# Patient Record
Sex: FEMALE | Race: BLACK OR AFRICAN AMERICAN | NOT HISPANIC OR LATINO
[De-identification: names, ages, dates, MRNs, and addresses within clinical notes are randomized per-mention and may not be internally consistent; named-entity substitution may affect disease eponyms.]

---

## 2020-11-16 ENCOUNTER — RESULT REVIEW (OUTPATIENT)
Age: 40
End: 2020-11-16

## 2024-05-04 ENCOUNTER — EMERGENCY (EMERGENCY)
Facility: HOSPITAL | Age: 44
LOS: 1 days | Discharge: HOME | End: 2024-05-04
Attending: EMERGENCY MEDICINE | Admitting: EMERGENCY MEDICINE
Payer: MEDICAID

## 2024-05-04 VITALS
HEART RATE: 73 BPM | DIASTOLIC BLOOD PRESSURE: 83 MMHG | OXYGEN SATURATION: 100 % | SYSTOLIC BLOOD PRESSURE: 169 MMHG | TEMPERATURE: 98 F | RESPIRATION RATE: 16 BRPM

## 2024-05-04 VITALS
TEMPERATURE: 99 F | OXYGEN SATURATION: 100 % | RESPIRATION RATE: 16 BRPM | DIASTOLIC BLOOD PRESSURE: 77 MMHG | HEART RATE: 70 BPM | SYSTOLIC BLOOD PRESSURE: 143 MMHG

## 2024-05-04 LAB
ALBUMIN SERPL ELPH-MCNC: 3.8 G/DL — SIGNIFICANT CHANGE UP (ref 3.3–5)
ALBUMIN SERPL ELPH-MCNC: 4 G/DL — SIGNIFICANT CHANGE UP (ref 3.3–5)
ALP SERPL-CCNC: 72 U/L — SIGNIFICANT CHANGE UP (ref 40–120)
ALP SERPL-CCNC: 76 U/L — SIGNIFICANT CHANGE UP (ref 40–120)
ALT FLD-CCNC: 13 U/L — SIGNIFICANT CHANGE UP (ref 4–33)
ALT FLD-CCNC: SIGNIFICANT CHANGE UP U/L (ref 4–33)
ANION GAP SERPL CALC-SCNC: 13 MMOL/L — SIGNIFICANT CHANGE UP (ref 7–14)
ANION GAP SERPL CALC-SCNC: 15 MMOL/L — HIGH (ref 7–14)
APPEARANCE UR: ABNORMAL
AST SERPL-CCNC: 12 U/L — SIGNIFICANT CHANGE UP (ref 4–32)
AST SERPL-CCNC: SIGNIFICANT CHANGE UP U/L (ref 4–32)
BASOPHILS # BLD AUTO: 0.04 K/UL — SIGNIFICANT CHANGE UP (ref 0–0.2)
BASOPHILS NFR BLD AUTO: 0.3 % — SIGNIFICANT CHANGE UP (ref 0–2)
BILIRUB SERPL-MCNC: 0.8 MG/DL — SIGNIFICANT CHANGE UP (ref 0.2–1.2)
BILIRUB SERPL-MCNC: 0.9 MG/DL — SIGNIFICANT CHANGE UP (ref 0.2–1.2)
BILIRUB UR-MCNC: ABNORMAL
BUN SERPL-MCNC: 7 MG/DL — SIGNIFICANT CHANGE UP (ref 7–23)
BUN SERPL-MCNC: 8 MG/DL — SIGNIFICANT CHANGE UP (ref 7–23)
CALCIUM SERPL-MCNC: 8.5 MG/DL — SIGNIFICANT CHANGE UP (ref 8.4–10.5)
CALCIUM SERPL-MCNC: 8.8 MG/DL — SIGNIFICANT CHANGE UP (ref 8.4–10.5)
CHLORIDE SERPL-SCNC: 101 MMOL/L — SIGNIFICANT CHANGE UP (ref 98–107)
CHLORIDE SERPL-SCNC: 102 MMOL/L — SIGNIFICANT CHANGE UP (ref 98–107)
CO2 SERPL-SCNC: 16 MMOL/L — LOW (ref 22–31)
CO2 SERPL-SCNC: 21 MMOL/L — LOW (ref 22–31)
COLOR SPEC: ABNORMAL
CREAT SERPL-MCNC: 0.51 MG/DL — SIGNIFICANT CHANGE UP (ref 0.5–1.3)
CREAT SERPL-MCNC: 0.61 MG/DL — SIGNIFICANT CHANGE UP (ref 0.5–1.3)
DIFF PNL FLD: ABNORMAL
EGFR: 115 ML/MIN/1.73M2 — SIGNIFICANT CHANGE UP
EGFR: 120 ML/MIN/1.73M2 — SIGNIFICANT CHANGE UP
EOSINOPHIL # BLD AUTO: 0.1 K/UL — SIGNIFICANT CHANGE UP (ref 0–0.5)
EOSINOPHIL NFR BLD AUTO: 0.7 % — SIGNIFICANT CHANGE UP (ref 0–6)
GLUCOSE SERPL-MCNC: 101 MG/DL — HIGH (ref 70–99)
GLUCOSE SERPL-MCNC: 91 MG/DL — SIGNIFICANT CHANGE UP (ref 70–99)
GLUCOSE UR QL: NEGATIVE MG/DL — SIGNIFICANT CHANGE UP
HCG SERPL-ACNC: <1 MIU/ML — SIGNIFICANT CHANGE UP
HCT VFR BLD CALC: 45.4 % — HIGH (ref 34.5–45)
HGB BLD-MCNC: 14.9 G/DL — SIGNIFICANT CHANGE UP (ref 11.5–15.5)
IANC: 9.82 K/UL — HIGH (ref 1.8–7.4)
IMM GRANULOCYTES NFR BLD AUTO: 0.4 % — SIGNIFICANT CHANGE UP (ref 0–0.9)
KETONES UR-MCNC: ABNORMAL MG/DL
LEUKOCYTE ESTERASE UR-ACNC: ABNORMAL
LYMPHOCYTES # BLD AUTO: 22.3 % — SIGNIFICANT CHANGE UP (ref 13–44)
LYMPHOCYTES # BLD AUTO: 3.1 K/UL — SIGNIFICANT CHANGE UP (ref 1–3.3)
MCHC RBC-ENTMCNC: 28.6 PG — SIGNIFICANT CHANGE UP (ref 27–34)
MCHC RBC-ENTMCNC: 32.8 GM/DL — SIGNIFICANT CHANGE UP (ref 32–36)
MCV RBC AUTO: 87.1 FL — SIGNIFICANT CHANGE UP (ref 80–100)
MONOCYTES # BLD AUTO: 0.76 K/UL — SIGNIFICANT CHANGE UP (ref 0–0.9)
MONOCYTES NFR BLD AUTO: 5.5 % — SIGNIFICANT CHANGE UP (ref 2–14)
NEUTROPHILS # BLD AUTO: 9.82 K/UL — HIGH (ref 1.8–7.4)
NEUTROPHILS NFR BLD AUTO: 70.8 % — SIGNIFICANT CHANGE UP (ref 43–77)
NITRITE UR-MCNC: POSITIVE
NRBC # BLD: 0 /100 WBCS — SIGNIFICANT CHANGE UP (ref 0–0)
NRBC # FLD: 0 K/UL — SIGNIFICANT CHANGE UP (ref 0–0)
PH UR: 5 — SIGNIFICANT CHANGE UP (ref 5–8)
PLATELET # BLD AUTO: 352 K/UL — SIGNIFICANT CHANGE UP (ref 150–400)
POTASSIUM SERPL-MCNC: 3.7 MMOL/L — SIGNIFICANT CHANGE UP (ref 3.5–5.3)
POTASSIUM SERPL-MCNC: SIGNIFICANT CHANGE UP MMOL/L (ref 3.5–5.3)
POTASSIUM SERPL-SCNC: 3.7 MMOL/L — SIGNIFICANT CHANGE UP (ref 3.5–5.3)
POTASSIUM SERPL-SCNC: SIGNIFICANT CHANGE UP MMOL/L (ref 3.5–5.3)
PROT SERPL-MCNC: 7.7 G/DL — SIGNIFICANT CHANGE UP (ref 6–8.3)
PROT SERPL-MCNC: 8.7 G/DL — HIGH (ref 6–8.3)
PROT UR-MCNC: 100 MG/DL
RBC # BLD: 5.21 M/UL — HIGH (ref 3.8–5.2)
RBC # FLD: 15.3 % — HIGH (ref 10.3–14.5)
SODIUM SERPL-SCNC: 131 MMOL/L — LOW (ref 135–145)
SODIUM SERPL-SCNC: 137 MMOL/L — SIGNIFICANT CHANGE UP (ref 135–145)
SP GR SPEC: 1.02 — SIGNIFICANT CHANGE UP (ref 1–1.03)
UROBILINOGEN FLD QL: 0.2 MG/DL — SIGNIFICANT CHANGE UP (ref 0.2–1)
WBC # BLD: 13.88 K/UL — HIGH (ref 3.8–10.5)
WBC # FLD AUTO: 13.88 K/UL — HIGH (ref 3.8–10.5)

## 2024-05-04 PROCEDURE — 99283 EMERGENCY DEPT VISIT LOW MDM: CPT | Mod: GC

## 2024-05-04 PROCEDURE — 93010 ELECTROCARDIOGRAM REPORT: CPT

## 2024-05-04 PROCEDURE — 74177 CT ABD & PELVIS W/CONTRAST: CPT | Mod: 26,MC

## 2024-05-04 PROCEDURE — 76830 TRANSVAGINAL US NON-OB: CPT | Mod: 26

## 2024-05-04 PROCEDURE — 99285 EMERGENCY DEPT VISIT HI MDM: CPT

## 2024-05-04 RX ORDER — SODIUM CHLORIDE 9 MG/ML
1000 INJECTION INTRAMUSCULAR; INTRAVENOUS; SUBCUTANEOUS ONCE
Refills: 0 | Status: COMPLETED | OUTPATIENT
Start: 2024-05-04 | End: 2024-05-04

## 2024-05-04 RX ORDER — ACETAMINOPHEN 500 MG
1000 TABLET ORAL ONCE
Refills: 0 | Status: COMPLETED | OUTPATIENT
Start: 2024-05-04 | End: 2024-05-04

## 2024-05-04 RX ORDER — CEFPODOXIME PROXETIL 100 MG
1 TABLET ORAL
Qty: 14 | Refills: 0
Start: 2024-05-04 | End: 2024-05-10

## 2024-05-04 RX ORDER — ONDANSETRON 8 MG/1
4 TABLET, FILM COATED ORAL ONCE
Refills: 0 | Status: COMPLETED | OUTPATIENT
Start: 2024-05-04 | End: 2024-05-04

## 2024-05-04 RX ORDER — CEFTRIAXONE 500 MG/1
1000 INJECTION, POWDER, FOR SOLUTION INTRAMUSCULAR; INTRAVENOUS ONCE
Refills: 0 | Status: COMPLETED | OUTPATIENT
Start: 2024-05-04 | End: 2024-05-04

## 2024-05-04 RX ORDER — MORPHINE SULFATE 50 MG/1
4 CAPSULE, EXTENDED RELEASE ORAL ONCE
Refills: 0 | Status: DISCONTINUED | OUTPATIENT
Start: 2024-05-04 | End: 2024-05-04

## 2024-05-04 RX ORDER — KETOROLAC TROMETHAMINE 30 MG/ML
30 SYRINGE (ML) INJECTION ONCE
Refills: 0 | Status: DISCONTINUED | OUTPATIENT
Start: 2024-05-04 | End: 2024-05-04

## 2024-05-04 RX ADMIN — Medication 1000 MILLIGRAM(S): at 18:54

## 2024-05-04 RX ADMIN — CEFTRIAXONE 1000 MILLIGRAM(S): 500 INJECTION, POWDER, FOR SOLUTION INTRAMUSCULAR; INTRAVENOUS at 19:57

## 2024-05-04 RX ADMIN — MORPHINE SULFATE 4 MILLIGRAM(S): 50 CAPSULE, EXTENDED RELEASE ORAL at 18:54

## 2024-05-04 RX ADMIN — CEFTRIAXONE 100 MILLIGRAM(S): 500 INJECTION, POWDER, FOR SOLUTION INTRAMUSCULAR; INTRAVENOUS at 19:53

## 2024-05-04 RX ADMIN — SODIUM CHLORIDE 1000 MILLILITER(S): 9 INJECTION INTRAMUSCULAR; INTRAVENOUS; SUBCUTANEOUS at 17:58

## 2024-05-04 RX ADMIN — MORPHINE SULFATE 4 MILLIGRAM(S): 50 CAPSULE, EXTENDED RELEASE ORAL at 17:58

## 2024-05-04 RX ADMIN — Medication 30 MILLIGRAM(S): at 19:57

## 2024-05-04 RX ADMIN — Medication 400 MILLIGRAM(S): at 17:58

## 2024-05-04 RX ADMIN — Medication 30 MILLIGRAM(S): at 19:54

## 2024-05-04 RX ADMIN — SODIUM CHLORIDE 1000 MILLILITER(S): 9 INJECTION INTRAMUSCULAR; INTRAVENOUS; SUBCUTANEOUS at 18:54

## 2024-05-04 RX ADMIN — ONDANSETRON 4 MILLIGRAM(S): 8 TABLET, FILM COATED ORAL at 17:58

## 2024-05-04 NOTE — ED PROVIDER NOTE - PATIENT PORTAL LINK FT
You can access the FollowMyHealth Patient Portal offered by [facility] by registering at the following website: [email or web address]. By joining [facility] FollowMyHealth portal, you will also be able to view your health information using other applications (apps) compatible with our system.

## 2024-05-04 NOTE — ED PROVIDER NOTE - PROGRESS NOTE DETAILS
"Chief Complaint  Blanca Beckman Jaci Dougherty, APRN  05/10/2022   Pelvic exam (chaperoned by RN Amauri Jacobsen) notable for R adnexal ttp w/o CMT or L tenderness, no palpable ovarian masses, dark red blood in vault, os closed. OBGYN consulted for intermittent torsion eval. Pending OBGYN consult and dispo. Updated regarding results of CT/TVUS and pending OBGYN consult, pain well controlled at this time. - Aranza Matias, PGY-3 Patient returned from ultrasound states pain is slightly improved but painful to palpation.  Noted to have a UTI.  Will order antibiotics, Toradol, repeat CMP. Ultrasound right ovarian follicle.  No evidence of torsion.  Left ovary not visualized but appears normal on CT.  CT normal appendix.  No acute intra-abdominal pathology.  Noted to have a leiomyoma.  Patient tender on pelvic exam done to by Dr. Matias.  Will consult OB/GYN for concern of intermittent torsion.  And likely CDU for observation. Patient cleared by OB/GYN for discharge, low concern for intermittent torsion at this time as patient pain-free, Pt stable, feeling improved, requesting discharge. Discussed results of workup, importance of follow-up with OBGYN/PMD, abx for UTI, and return precautions with patient who verbalized understanding and agreement. Pt had opportunity to ask questions and address concerns, and results of workup including lab and imaging, and incidental findings, were reviewed and provided in DC paperwork. Patient is medically clear for DC at this time. -Aranza Matias, PGY-3 Patient reassessed.  Offered and declined CDU. States pain has resolved.  Sitting up eating would like to go home.  Patient evaluated by OB/GYN cleared for discharge.  Strict return precautions given.

## 2024-05-04 NOTE — ED ADULT NURSE NOTE - NSFALLUNIVINTERV_ED_ALL_ED
Bed/Stretcher in lowest position, wheels locked, appropriate side rails in place/Call bell, personal items and telephone in reach/Instruct patient to call for assistance before getting out of bed/chair/stretcher/Non-slip footwear applied when patient is off stretcher/[place] to call system/Physically safe environment - no spills, clutter or unnecessary equipment/Purposeful proactive rounding/Room/bathroom lighting operational, light cord in reach

## 2024-05-04 NOTE — ED ADULT TRIAGE NOTE - CHIEF COMPLAINT QUOTE
Pt st" Since Thursday I have bad abdominal pain in rt ovary pain is getting worse. I am very nauseous. I have my cycle now the bleeding is heavier than usual and this pain is worse than my normal menstrual cramping." Pt appears uncomfortable . hx low platlets

## 2024-05-04 NOTE — ED ADULT NURSE REASSESSMENT NOTE - NS ED NURSE REASSESS COMMENT FT1
report received from NIKKO Maria. pt respirations even and unlabored, appears in NAD. No complaints of chest pain, headache, nausea, dizziness, vomiting  SOB, fever, chills verbalized. scan back, awaiting further orders.

## 2024-05-04 NOTE — ED PROVIDER NOTE - PHYSICAL EXAMINATION
GENERAL: NAD  HEAD: normocephalic, atraumatic  HEENT: normal conjunctiva, oral mucosa moist, uvula midline, neck supple  CARDIAC: regular rate and rhythm, normal S1S2, no appreciable murmurs  PULM: speaking in full sentences, normal breath sounds, clear to ascultation bilaterally, no rales, rhonchi, wheezing  GI: abdomen nondistended, soft, RLQ ttp  : no CVA tenderness b/l, no suprapubic tenderness  NEURO: moving all 4 extremities, no focal deficits, normal speech, AOx3  MSK: no peripheral edema, no calf tenderness b/l  SKIN: well-perfused, extremities warm

## 2024-05-04 NOTE — ED PROVIDER NOTE - DISCHARGE DATE
"Chief Complaint   Patient presents with     URI     started 2 days ago. Pt states that she had it before and it was getting better but in the last 2 days, it has been gettign worse. Pt has asthma.        Initial /90 (BP Location: Right arm, Patient Position: Chair, Cuff Size: Adult Regular)  Pulse 101  Temp 98.3  F (36.8  C) (Oral)  Wt 156 lb 12.8 oz (71.1 kg)  SpO2 98%  Breastfeeding? No  BMI 27.37 kg/m2 Estimated body mass index is 27.37 kg/(m^2) as calculated from the following:    Height as of 4/19/17: 5' 3.47\" (1.612 m).    Weight as of this encounter: 156 lb 12.8 oz (71.1 kg).  Medication Reconciliation: complete   Bonnie Mendosa MA        "
04-May-2024

## 2024-05-04 NOTE — ED PROVIDER NOTE - CLINICAL SUMMARY MEDICAL DECISION MAKING FREE TEXT BOX
43-year-old female with history of obesity on Ozempic, asthma presenting with RLQ abdominal pain.  Concern for ovarian torsion versus appendicitis versus kidney stone.  Labs, EKG, TVUS, CT A/P, UA/urine culture ordered.

## 2024-05-04 NOTE — ED ADULT NURSE NOTE - OBJECTIVE STATEMENT
pt received to  rm 26 , a&ox4 , ambulatory , phx  low platelets, splenectomy   , p/w nausea and R lower quadrant abd pain since Thursday. pt endorsed starting mensuration yesterday and bleeding is heavier then normal. Pt breathing even and unlabored on room air.  Denies fever, chills, cough, SOB, chest pain, palpitations, dizziness, diarrhea, constipation, numbness, tingling. IV placed. Labs collected and sent.  pending EKG and US  .pt educated on fall precautions and confirms understanding via teach back method. Stretcher locked in lowest position with siderails up x2. Call bell and personal items within reach.

## 2024-05-04 NOTE — ED PROVIDER NOTE - CARE PLAN
Principal Discharge DX:	Right lower quadrant pain   1 Principal Discharge DX:	Acute UTI  Secondary Diagnosis:	Right lower quadrant pain

## 2024-05-04 NOTE — ED PROVIDER NOTE - NSFOLLOWUPINSTRUCTIONS_ED_ALL_ED_FT
Please follow up with an obgyn and with your primary care doctor within 1 week. Bring copies of your results with you (provided in your discharge paperwork). Please stay well-hydrated.    You have been diagnosed with a urinary tract infection. You have been prescribed a course of antibiotics to treat this infection (cefpodoxime).  Please take as prescribed.  Please completely finish with medication even if you begin to feel better.    You may take 500-1000 mg acetaminophen (tylenol) every 6 hours, as needed for pain.  You may take 600 mg ibuprofen every 8 hours, with food, as needed for pain.  You can take tylenol and ibuprofen at the same time.     PLEASE RETURN TO ED FOR NEW OR WORSENING SYMPTOMS (intractable nausea/vomiting, severe bleeding, fever over 100.4F, loss of movement of one or more limbs, seizure)    Urinary Tract Infection    A urinary tract infection (UTI) is an infection of any part of the urinary tract, which includes the kidneys, ureters, bladder, and urethra. Risk factors include ignoring your need to urinate, wiping back to front if female, being an uncircumcised male, and having diabetes or a weak immune system. Symptoms include frequent urination, pain or burning with urination, foul smelling urine, cloudy urine, pain in the lower abdomen, blood in the urine, and fever. If you were prescribed an antibiotic medicine, take it as told by your health care provider. Do not stop taking the antibiotic even if you start to feel better.    SEEK IMMEDIATE MEDICAL CARE IF YOU HAVE ANY OF THE FOLLOWING SYMPTOMS: severe back or abdominal pain, fever, inability to keep fluids or medicine down, dizziness/lightheadedness, or a change in mental status.    Contact a health care provider if:  Pelvic pain that does not go away with pain medications.  Pelvic pain when you are pregnant.  A fever.    Get help right away if:  You have sudden, severe pelvic pain.  You have nausea and vomiting that does not go away.  These symptoms may represent a serious problem that is an emergency. Do not wait to see if the symptoms will go away. Get medical help right away. Call your local emergency services (911 in the [place] Do not drive yourself to the hospital.

## 2024-05-04 NOTE — CONSULT NOTE ADULT - ASSESSMENT
43y  LMP 5/3- current presents with 2-3 days cramping abdominal pain associated with the onset of her menses and relieved with Motrin at home. Upon arrival to ED VS stable, exam benign and imaging with 2cm right ovarian follicle and small uterine myoma. Pain resolved at the time of evaluation and likely 2/2 dysmenorrhea. No concern for ovarian torsion at this time.     - pain control with Naproxen/NSAIDs and Tylenol   - f/u outpatient with Dr. Browne - scheduled apt next month   - UTI tx per ED  - care per ED  - pad counts  - no acute GYN intervention     d/w Dr. Rylee Lynn PGY4

## 2024-05-04 NOTE — ED PROVIDER NOTE - OBJECTIVE STATEMENT
43-year-old female with history of obesity on Ozempic, asthma presenting with RLQ abdominal pain.  Patient reports that over the last 2 to 3 days she finished her menstrual cycle.  She has developed constant waxing and waning in severity right lower quadrant abdominal pain.  This morning it was associated with increased vaginal bleeding.  She has had subjective fever and chills at home.  Nausea no vomiting.  Patient states she is not currently sexually active.  She otherwise has no significant headache, lightheadedness, dizziness, chest pain, shortness of breath, V/D, dysuria, peripheral edema.  NKDA.  No prior abdominal surgeries.

## 2024-05-04 NOTE — CONSULT NOTE ADULT - ATTENDING COMMENTS
Agree with assessment and plan as stated above  Will treat for likely UTI  F/u with Dr. Browne as outpatient    Noé Mckee MD  Covering GYN [facility] Attending

## 2024-05-04 NOTE — CONSULT NOTE ADULT - SUBJECTIVE AND OBJECTIVE BOX
GYN Consult Note    HPI:  43y  LMP 5/3- current presents with 2-3 days cramping abdominal pain associated with the onset of her menses and relieved with Motrin at home. This morning the pain worsened and was associated with heavy menses so she presented to the ED. She reports soaking 3-4 pads/day and has regular monthly menses. At the time of evaluation the patient reports that her pain is resolved at this time. Denies dysuria and increased urinary frequency. Denies HA, SOB, CP, RUQ/epigastric pain, b/l swelling LE and UE, or vision changes    OB/GYN HISTORY:   TOP x2 s/p D+C x2  ectopic s/p Left salpingectomy at age 17    Last Menstrual Period: 5/3-current    Name of GYN Physician: Dr. Browne   Denies fibroids, cysts, abnormal paps, STIs      PAST MEDICAL & SURGICAL HISTORY:  Asthma  Obesity on ozempic  left salpingectomy age 17    All: NKDA  Meds: Ozempic, Albuterol   Shx: 1-2 cigarettes/day since age 20 (on and off), marijuana use         REVIEW OF SYSTEMS  General: denies fevers, chills, tiredness  Skin/Breast: denies breast pain  Respiratory and Thorax: denies shortness of breath, denies cough  Cardiovascular: denies chest pain and denies palpitations  Gastrointestinal: +abdominal pain, denies nausea/ vomiting	  Genitourinary: denies dysuria, increased urinary frequency, urgency	  Constitutional, Cardiovascular, Respiratory, Gastrointestinal, Genitourinary, Musculoskeletal and Integumentary review of systems are normal except as noted. 	        Vital Signs Last 24 Hrs  T(C): 36.8 (04 May 2024 19:08), Max: 36.8 (04 May 2024 16:27)  T(F): 98.2 (04 May 2024 19:08), Max: 98.3 (04 May 2024 16:27)  HR: 72 (04 May 2024 19:08) (65 - 73)  BP: 159/73 (04 May 2024 19:08) (133/78 - 169/83)  BP(mean): --  RR: 17 (04 May 2024 19:08) (16 - 17)  SpO2: 100% (04 May 2024 19:08) (100% - 100%)    Parameters below as of 04 May 2024 19:08  Patient On (Oxygen Delivery Method): room air        PHYSICAL EXAM:   Gen: NAD, alert and oriented x 3  Cardiovascular: regular   Respiratory: breathing comfortably on RA  Abd: soft, non tender, non-distended. Point suprapubic tenderness upon deep palpation   Pelvic: deferred because she just had one performed  Extremities: NTBL  Skin: warm and well perfused      LABS:                        14.9   13.88 )-----------( 352      ( 04 May 2024 17:44 )             45.4         137  |  101  |  7   ----------------------------<  91  3.7   |  21<L>  |  0.61    Ca    8.5      04 May 2024 19:23    TPro  7.7  /  Alb  4.0  /  TBili  0.8  /  DBili  x   /  AST  12  /  ALT  13  /  AlkPhos  72  -      Urinalysis Basic - ( 04 May 2024 19:23 )    Color: x / Appearance: x / SG: x / pH: x  Gluc: 91 mg/dL / Ketone: x  / Bili: x / Urobili: x   Blood: x / Protein: x / Nitrite: x   Leuk Esterase: x / RBC: x / WBC x   Sq Epi: x / Non Sq Epi: x / Bacteria: x        RADIOLOGY & ADDITIONAL STUDIES:    < from: US Transvaginal (24 @ 18:40) >  IMPRESSION:  Right ovarian follicle. There is no evidence of right ovarian torsion.    The left ovary was not visualized but appears normal on the prior CT.    Small uterine myoma.    < end of copied text >    < from: CT Abdomen and Pelvis w/ IV Cont (24 @ 18:52) >  IMPRESSION:  No acute intra-abdominal pathology visualized.    Mild heterogeneity in the uterine fundus which may be due to a leiomyoma.   Clinical correlation will determine the need for further evaluation with   pelvic ultrasound.    < end of copied text >

## 2024-05-06 LAB
CULTURE RESULTS: SIGNIFICANT CHANGE UP
SPECIMEN SOURCE: SIGNIFICANT CHANGE UP